# Patient Record
Sex: FEMALE | Race: BLACK OR AFRICAN AMERICAN
[De-identification: names, ages, dates, MRNs, and addresses within clinical notes are randomized per-mention and may not be internally consistent; named-entity substitution may affect disease eponyms.]

---

## 2020-06-30 NOTE — RO
DATE OF SURGERY:  06/25/2020

 

PREOPERATIVE DIAGNOSES:

1.  Left knee loose body.

2.  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

 

POSTOPERATIVE DIAGNOSES:

1.  Left knee loose body.

2.  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

 

PROCEDURE:

1.  Left knee diagnostic arthroscopy.

2.  Left knee open loose body removal.

3.  Left knee open osteochondral allograft transplant to the medial femoral

condyle with a fresh sized matched graft.

 

SURGEON:  Dr. Afshin Mendieta

 

FIRST ASSISTANT:  PAUL Cates

 

ANESTHESIA:  General with preoperative nerve block.

 

IV FLUIDS:  Lactated Ringer's.

 

ESTIMATED BLOOD LOSS:  10 mL.

 

IMPLANTS:  15 mm diameter full-thickness osteochondral allograft.

 

CLOSURE:  Nylon.

 

DESCRIPTION OF PROCEDURE:

The patient was identified in preoperative holding area.  The left leg was

marked.  She had an adductor canal block from anesthesia.

 

She was then brought to the operating room, placed supine on a well-padded OR

table.  General anesthesia induced.

 

Exam under anesthesia revealed 5 degrees of hyperextension, a grade 1 A Lachman,

stable to varus and valgus stress, 1-1/2 quadrants of lateral patellar mobility.

Flexion 135 degrees.

 

A well-padded tourniquet was applied the left side.  The left leg was then

prepped and draped in normal sterile fashion with Chloraprep from the toes up to

the tourniquet.

 

Prior to incision, a time-out was performed per hospital protocol.  She received

appropriate IV antibiotics within 1 hour of incision.

 

Wilmer Gaxiola was present for the entire procedure and participated in all

essential portions of the procedure.  This included patient positioning and

draping, holding retractors and holding the leg during the arthrotomy, and most

importantly holding retractors and the patella everted while I was sizing and

drilling the defect site.  He was also crucial helping to prepare the allograft

plug on the back table.  He also performed the wound closure, applied the

dressing and brace.

 

The left leg was exsanguinated with an Esmarch bandage and the tourniquet

inflated 250 mmHg.  The knee was insufflated with lactated Ringer's.  A standard

anterolateral portal was localized with a spinal needle, then the incision made

we an 11-blade.

 

A 30 degree arthroscope was introduced into the joint.  There was no

chondromalacia on the patella or trochlea.  There was mild synovitis in the

superior pouch, no loose bodies there.  Medial and lateral ladder gutters were

clear of loose bodies.  There was a large loose body in the anterior compartment

just anterior to the anterior cruciate ligament (ACL).  There was no tearing of

the medial lateral meniscus.  There was a large defect in the far lateral portion

of the medial femoral condyle.  This approached the posterior cruciate ligament

(PCL).  The knee was then irrigated and drained.

 

I then made a longitudinal medial parapatellar incision and arthrotomy leaving a

cuff of tissue on the patella.  Care was taken to distally to avoid damage to the

medial meniscus.  Great care was taken distally to avoid damage to the anterior

horn of the medial meniscus.  Quadriceps was longitudinally split proximally to

allow adequate eversion.  Due to this lesion being at the intercondylar notch, I

did require further exposure to get the proper angle for drilling.  The loose

body was identified and removed with forceps.

 

Next, the retractors were placed exposing the full-thickness osteochondral defect

in the medial femoral condyle.  The lesion measured roughly 20 x 13 mm.  The

appropriate sizers were then trialed and although an 18 mm diameter plug would

have better fill of the defect it would have led to poor containment at the notch

with increased instability and possible damage to the PCL.  So, ultimately it was

felt that a 15 mm cylinder plug would give greater than 80% fill and this would

also maximize stability of the plug and minimize damage to the PCL.

 

So, with the 15 mm sizing block a guidepin was advanced 2 cm.  The 12, 4 and 8

o'clock positions were marked with a marking pen.  The appropriate 15 mm reamer

was then used with extensive irrigation to avoid femoral necrosis and the defect

was reamed.  Markings were noted at the 12, 4 and 8 o'clock positions.  Guidepin

was removed.  Patella was reduced.  The whole joint was extensively irrigated.

 

On the back table the fresh non-frozen avery condyle allograft was opened and

irrigated.  The graft was secured to the graft station and stabilized.  The

targeting arm was used to position the green sizer to match the anatomic contour

of the patient's medial femoral condyle.  With the targeting arm locked in place

the appropriate bushing was placed.  The appropriate reamer was then used to

drill 15 mm plug.  Extensive irrigation was used again to avoid dermal necrosis.

A sagittal saw was used to complete the cut at the depth of the plug.  So, now

the 15 mm diameter plug was freed from the allograft avery condyle and was

irrigated.  Appropriate markings at 12, 4 and 8 were made with a marking pen.

The graft was secured with the appropriate clamp and then a sagittal saw used to

cut the graft to the appropriate depth.  Rongeur was used to taper the edges to

help with seat properly.  The graft was re-irrigated.

 

Next, the patella was everted.  Retractors were placed and the allograft plug was

placed by hand requiring a fairly aggressive pressure but was achieved for the

most part manually.  With simple manual pressure the graft fit extremely well and

it was only 0.5 mm proud at the 9 o'clock position.  So, the tamp was gently used

to further advance the graft until it was flushed.  The knee was flexed and

extended and there was no crepitus.  There was no instability with posterior

drawer.  The knee was irrigated and then the knee was put onto its radiolucent

triangle and then, the medial parapatellar arthrotomy was closed with #0 Vicryl

suture in a figure-of-eight fashion with the knee at 40 degrees of flexion.

 

Next, subcuticular closure with #2-0 Vicryl and running #3-0 nylon.  The

arthroscopy portals closed with #3-0 nylon.  Tourniquet was let down with

excellent reperfusion.  Bulky sterile dressing applied.  She was carefully placed

into her knee brace locked extension.  All counts correct times two.

Complications none.

 

The patient will be toe-touch weightbearing for 6 weeks.  She will have her

hinged knee brace locked in extension.  Physical therapy must start within 7-10

days.  She will have full dose aspirin for 1 month starting postop day #1 for

deep venous thrombosis (DVT) prophylaxis.

## 2021-07-14 ENCOUNTER — HOSPITAL ENCOUNTER (OUTPATIENT)
Dept: HOSPITAL 53 - M PLAIMG | Age: 26
End: 2021-07-14
Attending: INTERNAL MEDICINE

## 2021-07-14 DIAGNOSIS — Z00.00: Primary | ICD-10-CM

## 2021-07-14 NOTE — REP
INDICATION:

PAIN.



COMPARISON:

Comparison left knee radiographs are from March 4, 2020..



TECHNIQUE:

AP, lateral, and sunrise views of the left knee are provided.



FINDINGS:

Previously noted area of flattening and sclerosis in the medial femoral condyle is

again seen.  This appears somewhat smoother than on the prior study.  The previously

noted probable loose body is no longer apparent.  There is minimal medial tibiofemoral

spurring.  No joint effusion is seen.  Lateral radiograph demonstrates mild superior

pole patellar spurring.  No evidence of joint effusion.



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

condyle with a displaced osteochondral loose body.



IMPRESSION:

Area of sclerosis and slight flattening of the medial femoral condyle where prior

study showed osteochondral defect.  Minimal medial and patellofemoral spurring.  No

acute bony abnormality..







<Electronically signed by Vishnu Villalpando > 07/14/21 [id number]

## 2021-07-14 NOTE — REP
INDICATION:

PAIN.



COMPARISON:

No comparison chest x-ray



TECHNIQUE:

Two views..



FINDINGS:

The lungs are well inflated and free of infiltrate.  The pleural angles are sharp.

The heart size is normal.  Pulmonary vasculature is not increased.  No significant

bony abnormality is seen.



IMPRESSION:

Negative chest x-ray.





<Electronically signed by Vishnu Villalpando > 07/14/21 [id number]